# Patient Record
Sex: FEMALE | Race: WHITE | NOT HISPANIC OR LATINO | URBAN - NONMETROPOLITAN AREA
[De-identification: names, ages, dates, MRNs, and addresses within clinical notes are randomized per-mention and may not be internally consistent; named-entity substitution may affect disease eponyms.]

---

## 2019-08-28 ENCOUNTER — IMPORTED ENCOUNTER (OUTPATIENT)
Dept: URBAN - NONMETROPOLITAN AREA CLINIC 1 | Facility: CLINIC | Age: 69
End: 2019-08-28

## 2019-08-28 PROBLEM — H52.4: Noted: 2019-08-28

## 2019-08-28 PROBLEM — H25.813: Noted: 2019-08-28

## 2019-08-28 PROCEDURE — 99204 OFFICE O/P NEW MOD 45 MIN: CPT

## 2019-08-28 PROCEDURE — 92015 DETERMINE REFRACTIVE STATE: CPT

## 2019-08-28 NOTE — PATIENT DISCUSSION
Combined Cataracts OUDiscussed diagnosis with patient. Reviewed symptoms related to cataract progression. Discussed various treatment options with patient. Recommend cataract evaluation by Dr. Ranjith Price. Patient elects to schedule cat eval.Continue to monitor. Presbyopia OUDiscussed refractive status in detail with patient. New glasses Rx given today. (not rec to to fill)Continue to monitor.

## 2019-10-17 ENCOUNTER — IMPORTED ENCOUNTER (OUTPATIENT)
Dept: URBAN - NONMETROPOLITAN AREA CLINIC 1 | Facility: CLINIC | Age: 69
End: 2019-10-17

## 2019-10-17 PROCEDURE — 92014 COMPRE OPH EXAM EST PT 1/>: CPT

## 2019-10-17 NOTE — PATIENT DISCUSSION
Cataract(s)-Visually significant cataract OU .-Cataract(s) causing symptomatic impairment of visual function not correctable with a tolerable change in glasses or contact lenses lighting or non-operative means resulting in specific activity limitations and/or participation restrictions including but not limited to reading viewing television driving or meeting vocational or recreational needs. -Expectation is clearer vision and functional improvement in symptoms as well as reduced glare disability after cataract removal.-Order IOLMaster and OPD today. -Recommend Standard/Traditional based on today's OPD testing and lifestyle questionnaire.-All questions were answered regarding surgery including pre and post-op medications appointments activity restrictions and anesthetic usage.-The risks benefits and alternatives and special risk factors for the patient were discussed in detail including but not limited to: bleeding infection retinal detachment vitreous loss problems with the implant and possible need for additional surgery.-Although rare the possibility of complete vision loss was discussed.-The possible need for glasses post-operatively was discussed.-Order medical clearance exam based on history of thyroid problems -Patient elects to proceed with cataract surgery OS . Will schedule at patient's convenience and re-evaluate OD  in the future.

## 2019-10-28 ENCOUNTER — IMPORTED ENCOUNTER (OUTPATIENT)
Dept: URBAN - NONMETROPOLITAN AREA CLINIC 1 | Facility: CLINIC | Age: 69
End: 2019-10-28

## 2019-10-28 PROBLEM — H25.813: Noted: 2019-10-28

## 2019-10-28 PROBLEM — H52.4: Noted: 2019-10-28

## 2019-10-30 ENCOUNTER — IMPORTED ENCOUNTER (OUTPATIENT)
Dept: URBAN - NONMETROPOLITAN AREA CLINIC 1 | Facility: CLINIC | Age: 69
End: 2019-10-30

## 2019-10-30 PROBLEM — Z01.818: Noted: 2019-10-30

## 2019-10-30 PROBLEM — E07.9: Noted: 2019-10-30

## 2019-11-22 ENCOUNTER — IMPORTED ENCOUNTER (OUTPATIENT)
Dept: URBAN - NONMETROPOLITAN AREA CLINIC 1 | Facility: CLINIC | Age: 69
End: 2019-11-22

## 2019-11-22 PROBLEM — Z98.42: Noted: 2019-11-22

## 2019-11-22 PROCEDURE — 66984 XCAPSL CTRC RMVL W/O ECP: CPT

## 2019-11-22 PROCEDURE — 92136 OPHTHALMIC BIOMETRY: CPT

## 2019-11-22 PROCEDURE — 99024 POSTOP FOLLOW-UP VISIT: CPT

## 2019-11-22 NOTE — PATIENT DISCUSSION
Same day s/p PCIOL OS-Pt doing well s/p PCIOL. -Continue post-op gtts according to instruction sheet and sleep with eye shield over eye for 7 nights.-Avoid bending at the waist lifting anything over 5lbs and dirty or mika environments.

## 2019-11-26 ENCOUNTER — IMPORTED ENCOUNTER (OUTPATIENT)
Dept: URBAN - NONMETROPOLITAN AREA CLINIC 1 | Facility: CLINIC | Age: 69
End: 2019-11-26

## 2019-11-26 PROBLEM — H25.811: Noted: 2019-11-26

## 2019-11-26 PROBLEM — Z98.42: Noted: 2019-11-26

## 2019-11-26 PROBLEM — Z01.818: Noted: 2019-11-26

## 2019-11-26 PROBLEM — E07.9: Noted: 2019-11-26

## 2019-11-26 PROCEDURE — 92012 INTRM OPH EXAM EST PATIENT: CPT

## 2019-11-26 PROCEDURE — 99024 POSTOP FOLLOW-UP VISIT: CPT

## 2019-11-26 NOTE — PATIENT DISCUSSION
Cataract OD-Visually significant cataract OD. -Cataract causing symptomatic impairment of visual function not correctable with a tolerable change in glasses or contact lenses lighting or non-operative means resulting in specific activity limitations and/or participation restrictions including but not limited to reading viewing television driving or meeting vocational or recreational needs. -Expectation is clearer vision and functional improvement in symptoms as well as reduced glare disability after cataract removal.-Recommend standard/traditional based on previous OPD testing and lifestyle questionnaire.-All questions were answered regarding surgery including pre and post-op medications appointments activity restrictions and anesthetic usage.-The risks benefits and alternatives and special risk factors for the patient were discussed in detail including but not limited to: bleeding infection retinal detachment vitreous loss problems with the implant and possible need for additional surgery.-Although rare the possibility of complete vision loss was discussed.-The need for glasses post-operatively was discussed.-Patient elects to proceed with cataract surgery OD. Will schedule at patient's convenience. 4 day s/p PCIOL OS-Pt doing well at 4 day s/p PCIOL OS.-Continue post-op gtts according to instruction sheet.-Okay to resume usual activities and d/c eye shield.

## 2019-12-05 ENCOUNTER — IMPORTED ENCOUNTER (OUTPATIENT)
Dept: URBAN - NONMETROPOLITAN AREA CLINIC 1 | Facility: CLINIC | Age: 69
End: 2019-12-05

## 2019-12-05 PROBLEM — Z98.42: Noted: 2019-12-05

## 2019-12-05 PROBLEM — Z98.41: Noted: 2019-12-05

## 2019-12-05 PROCEDURE — 92136 OPHTHALMIC BIOMETRY: CPT

## 2019-12-05 PROCEDURE — 99024 POSTOP FOLLOW-UP VISIT: CPT

## 2019-12-05 PROCEDURE — 66984 XCAPSL CTRC RMVL W/O ECP: CPT

## 2019-12-05 NOTE — PATIENT DISCUSSION
s/p PCIOL Same Day POV CE OD Standard IOL -Pt doing well s/p PCIOL. -Continue post-op gtts according to instruction sheet and sleep with eye shield over eye for 7 nights.-Avoid bending at the waist lifting anything over 5lbs and dirty or mika environments. s/p PCIOL CE OS Standard IOL 11/22/19-Pt is doing well s.p PCIOL-Continue all gtts -Continue to monitor Patient state she is getting a yeast infection from all the sterioids. Patient asks if she can stop the pred I informed her she can d/c this gtts in OS but I rec her continue wiht OD.

## 2019-12-12 ENCOUNTER — IMPORTED ENCOUNTER (OUTPATIENT)
Dept: URBAN - NONMETROPOLITAN AREA CLINIC 1 | Facility: CLINIC | Age: 69
End: 2019-12-12

## 2019-12-12 PROCEDURE — 99024 POSTOP FOLLOW-UP VISIT: CPT

## 2019-12-12 NOTE — PATIENT DISCUSSION
1 week s/p PCIOL OD-Pt doing well at 1 week s/p PCIOL. -Continue post-op gtts according to instruction sheet.-Okay to resume usual activites and d/c eye shield. 3 week s/p PCIOL OS-Pt doing well at 3 week s/p PCIOL. Due to scatchy irritated feeling recommend begin art tears

## 2019-12-30 ENCOUNTER — IMPORTED ENCOUNTER (OUTPATIENT)
Dept: URBAN - NONMETROPOLITAN AREA CLINIC 1 | Facility: CLINIC | Age: 69
End: 2019-12-30

## 2019-12-30 PROBLEM — H26.492: Noted: 2019-12-30

## 2019-12-30 PROBLEM — H04.123: Noted: 2019-12-30

## 2019-12-30 PROBLEM — Z96.1: Noted: 2019-12-30

## 2019-12-30 PROCEDURE — 99212 OFFICE O/P EST SF 10 MIN: CPT

## 2019-12-30 NOTE — PATIENT DISCUSSION
Pseudophakia OU w/ Early PCO OS -Explained symptoms of advancing PCO. -Continue to monitor for now. Pt will notify us if any new symptoms develop.-Best uncorrected vision is 20/25 and with mild correction patient has 20/20   vision. Due to this I do not recc YAG @ this time. -Keep next appt in 2 weeks. -Continue to monitor. COBY-Explained COBY and associated symptoms.-Recommend increasing Omega 3s.-Pt to begin artificial tears OU QID PRN. Pt will contact us if this does not provide relief. Consider punctal plugs in that case.

## 2020-01-13 ENCOUNTER — IMPORTED ENCOUNTER (OUTPATIENT)
Dept: URBAN - NONMETROPOLITAN AREA CLINIC 1 | Facility: CLINIC | Age: 70
End: 2020-01-13

## 2020-01-13 PROBLEM — H26.492: Noted: 2020-01-13

## 2020-01-13 PROBLEM — Z98.42: Noted: 2020-01-13

## 2020-01-13 PROBLEM — Z98.41: Noted: 2020-01-13

## 2020-01-13 PROBLEM — H04.123: Noted: 2020-01-13

## 2020-01-13 PROCEDURE — 99024 POSTOP FOLLOW-UP VISIT: CPT

## 2020-01-13 NOTE — PATIENT DISCUSSION
Pseudophakia OU w/ Early PCO OS - Doing well no further treatment indicated. COBY-Explained COBY and associated symptoms.-Recommend increasing Omega 3s.-Pt to begin artificial tears OU QID PRN. Pt will contact us if this does not provide relief. Consider punctal plugs in that case.

## 2020-07-14 ENCOUNTER — IMPORTED ENCOUNTER (OUTPATIENT)
Dept: URBAN - NONMETROPOLITAN AREA CLINIC 1 | Facility: CLINIC | Age: 70
End: 2020-07-14

## 2020-07-14 PROBLEM — H26.492: Noted: 2020-07-14

## 2020-07-14 PROBLEM — Z96.1: Noted: 2020-07-14

## 2020-07-14 PROBLEM — H04.123: Noted: 2020-07-14

## 2020-07-14 PROCEDURE — 66821 AFTER CATARACT LASER SURGERY: CPT

## 2020-07-14 PROCEDURE — 99214 OFFICE O/P EST MOD 30 MIN: CPT

## 2020-07-14 NOTE — PATIENT DISCUSSION
PCO OS- Doing well following the previous phacoemulsification cataract extraction and posterior chamber intraocular lens implantation with reduction in vision due to opacification of the posterior capsule YAG laser posterior capsulotomy was performed today without difficulty and the patient was scheduled to return in two to four weeks for follow-up evaluation. Pseudophakia OU w/ Early PCO OS - Doing well. COBY-Explained COBY and associated symptoms.-Recommend increasing Omega 3s.-Pt to begin artificial tears OU QID PRN. Pt will contact us if this does not provide relief. Consider punctal plugs in that case.

## 2020-07-28 ENCOUNTER — IMPORTED ENCOUNTER (OUTPATIENT)
Dept: URBAN - NONMETROPOLITAN AREA CLINIC 1 | Facility: CLINIC | Age: 70
End: 2020-07-28

## 2020-07-28 PROBLEM — Z96.1: Noted: 2020-07-14

## 2020-07-28 PROBLEM — Z98.890: Noted: 2020-07-28

## 2020-07-28 PROBLEM — H04.123: Noted: 2020-07-14

## 2020-07-28 PROCEDURE — 99024 POSTOP FOLLOW-UP VISIT: CPT

## 2020-07-28 NOTE — PATIENT DISCUSSION
Pseudophakia OU s/p YAG Caps OS- Doing well no further treatment indicatedDES-Explained COBY and associated symptoms.-Recommend increasing Omega 3s.-Pt to begin artificial tears OU QID PRN. Pt will contact us if this does not provide relief. Consider punctal plugs in that case.

## 2020-12-23 NOTE — PATIENT DISCUSSION
Contact lens Rx - update. Trials ordered. 01/13/21: Update: Pt collected and prefers new rx; rx given.

## 2020-12-23 NOTE — PATIENT DISCUSSION
Proper hygiene and care of contact lenses discussed.  To use [facility] once a month if possible at least.

## 2022-04-15 ASSESSMENT — TONOMETRY
OD_IOP_MMHG: 10
OS_IOP_MMHG: 14
OS_IOP_MMHG: 14
OD_IOP_MMHG: 13
OS_IOP_MMHG: 06
OD_IOP_MMHG: 13
OD_IOP_MMHG: 12
OD_IOP_MMHG: 12
OS_IOP_MMHG: 12
OS_IOP_MMHG: 13
OS_IOP_MMHG: 13
OD_IOP_MMHG: 12
OS_IOP_MMHG: 12
OS_IOP_MMHG: 10
OD_IOP_MMHG: 13
OD_IOP_MMHG: 12
OS_IOP_MMHG: 12
OS_IOP_MMHG: 13

## 2022-04-15 ASSESSMENT — VISUAL ACUITY
OS_AM: 20/20
OD_SC: 20/25-
OS_AM: 20/20
OD_CC: 20/60-
OD_GLARE: 20/40
OS_CC: 20/25-1
OD_CC: 20/30
OD_PAM: 20/20
OS_SC: 20/30
OS_CC: 20/40
OS_GLARE: 20/60-2
OS_CC: 20/20-
OS_CC: 20/20-
OD_CC: 20/20
OS_GLARE: 20/60-2
OS_SC: 20/30
OD_CC: 20/20-2
OS_CC: 20/20
OS_SC: 20/25-
OD_GLARE: 20/40
OS_GLARE: 20/70
OD_PAM: 20/20
OD_CC: 20/60-
OD_GLARE: 20/40
OD_GLARE: 20/50
OD_CC: 20/30
OS_CC: 20/25+2
OD_CC: 20/30-2
OD_CC: 20/20-1
OS_SC: 20/30
OD_PAM: 20/20
OD_SC: 20/25-
OD_PAM: 20/20
OD_SC: 20/25-
OS_CC: 20/20-2
OD_GLARE: 20/40
OD_CC: 20/20

## 2024-01-31 ENCOUNTER — NEW PATIENT (OUTPATIENT)
Dept: RURAL CLINIC 3 | Facility: CLINIC | Age: 74
End: 2024-01-31

## 2024-01-31 DIAGNOSIS — H43.813: ICD-10-CM

## 2024-01-31 DIAGNOSIS — H52.4: ICD-10-CM

## 2024-01-31 PROCEDURE — 92015 DETERMINE REFRACTIVE STATE: CPT

## 2024-01-31 PROCEDURE — 99204 OFFICE O/P NEW MOD 45 MIN: CPT

## 2024-01-31 ASSESSMENT — TONOMETRY
OD_IOP_MMHG: 14
OS_IOP_MMHG: 14

## 2024-01-31 ASSESSMENT — VISUAL ACUITY
OD_SC: 20/25
OS_SC: 20/25